# Patient Record
Sex: MALE | Race: WHITE | Employment: FULL TIME | URBAN - METROPOLITAN AREA
[De-identification: names, ages, dates, MRNs, and addresses within clinical notes are randomized per-mention and may not be internally consistent; named-entity substitution may affect disease eponyms.]

---

## 2022-07-25 ENCOUNTER — OFFICE VISIT (OUTPATIENT)
Dept: ENT CLINIC | Age: 27
End: 2022-07-25
Payer: COMMERCIAL

## 2022-07-25 VITALS
HEIGHT: 74 IN | WEIGHT: 243 LBS | SYSTOLIC BLOOD PRESSURE: 116 MMHG | BODY MASS INDEX: 31.18 KG/M2 | DIASTOLIC BLOOD PRESSURE: 82 MMHG

## 2022-07-25 DIAGNOSIS — R09.81 NASAL CONGESTION: ICD-10-CM

## 2022-07-25 DIAGNOSIS — H69.83 ETD (EUSTACHIAN TUBE DYSFUNCTION), BILATERAL: ICD-10-CM

## 2022-07-25 DIAGNOSIS — Z96.22 BILATERAL PATENT PRESSURE EQUALIZATION TUBES: ICD-10-CM

## 2022-07-25 DIAGNOSIS — J32.9 RECURRENT SINUSITIS: Primary | ICD-10-CM

## 2022-07-25 DIAGNOSIS — J34.89 SINUS PRESSURE: ICD-10-CM

## 2022-07-25 PROCEDURE — 99203 OFFICE O/P NEW LOW 30 MIN: CPT | Performed by: PHYSICIAN ASSISTANT

## 2022-07-25 ASSESSMENT — ENCOUNTER SYMPTOMS
ABDOMINAL PAIN: 0
COUGH: 0
EYE DISCHARGE: 0

## 2022-07-28 LAB
BACTERIA SPEC CULT: ABNORMAL
BACTERIA SPEC CULT: ABNORMAL
SERVICE CMNT-IMP: ABNORMAL

## 2022-07-29 ENCOUNTER — TELEPHONE (OUTPATIENT)
Dept: ENT CLINIC | Age: 27
End: 2022-07-29

## 2022-07-29 RX ORDER — CEFUROXIME AXETIL 500 MG/1
500 TABLET ORAL 2 TIMES DAILY
Qty: 20 TABLET | Refills: 0 | Status: SHIPPED | OUTPATIENT
Start: 2022-07-29 | End: 2022-08-08

## 2022-08-25 ENCOUNTER — OFFICE VISIT (OUTPATIENT)
Dept: ENT CLINIC | Age: 27
End: 2022-08-25
Payer: COMMERCIAL

## 2022-08-25 VITALS — HEIGHT: 74 IN | WEIGHT: 242 LBS | RESPIRATION RATE: 18 BRPM | BODY MASS INDEX: 31.06 KG/M2

## 2022-08-25 DIAGNOSIS — J34.89 SINUS PRESSURE: ICD-10-CM

## 2022-08-25 DIAGNOSIS — R09.81 NASAL CONGESTION: ICD-10-CM

## 2022-08-25 DIAGNOSIS — J32.9 RECURRENT SINUSITIS: Primary | ICD-10-CM

## 2022-08-25 PROCEDURE — 99213 OFFICE O/P EST LOW 20 MIN: CPT | Performed by: PHYSICIAN ASSISTANT

## 2022-08-25 ASSESSMENT — ENCOUNTER SYMPTOMS
VOMITING: 0
COLOR CHANGE: 0
WHEEZING: 0
DIARRHEA: 0
EYE PAIN: 0
COUGH: 0

## 2022-08-25 NOTE — PROGRESS NOTES
Chief Complaint   Patient presents with    Follow-up     Patient states that he is still congested. HPI:  Romain Villarreal is a 27 y.o. male seen to follow up on congestion. He states that he is not as congested in the morning but by the end of the day he is still congested. He reports that he continues to have pressure in his sinuses. Patient states that he completed his antibiotic but did not receive the nasal irrigations. Past Medical History, Past Surgical History, Family history, Social History, and Medications were all reviewed with the patient today and updated as necessary. No Known Allergies  There is no problem list on file for this patient. No current outpatient medications on file. No current facility-administered medications for this visit. History reviewed. No pertinent past medical history. Social History     Tobacco Use    Smoking status: Not on file    Smokeless tobacco: Not on file   Substance Use Topics    Alcohol use: Not on file     No past surgical history on file. No family history on file. ROS:    Review of Systems   Constitutional:  Negative for chills. HENT:  Positive for congestion. Eyes:  Negative for pain. Respiratory:  Negative for cough and wheezing. Cardiovascular:  Negative for chest pain and palpitations. Gastrointestinal:  Negative for diarrhea and vomiting. Musculoskeletal:  Negative for neck pain. Skin:  Negative for color change and wound. Allergic/Immunologic: Negative for environmental allergies. Neurological:  Negative for dizziness and headaches. PHYSICAL EXAM:    Resp 18   Ht 6' 2\" (1.88 m)   Wt 242 lb (109.8 kg)   BMI 31.07 kg/m²     Head  Head and Face - The head and face are atraumatic, normocephalic. The salivary glands are intact and the facial appearance is symmetric.     Head shape - No scars, lesions, or masses    Ear  Ear - Tympanic membranes are clear, the external auditory canal is without discharge and the tympanic membranes are mobile. There is no tympanic membrane erythema and no middle ear opacity is visualized. Pinna: bilateral - No hematomas or lacerations    Eye  Eyeball - bilateral - extraocular motions intact, equal in size and movement    Nose and Sinuses  Nose - mucosa is pink and the septum is midline. There are no nasal lesions and there was moderate turbinate hypertrophy. Mouth and Throat  Lips - upper lip - normal: no dryness, cracking, pallor, cyanosis, or vesicular eruption. Lower lip: normal: no dryness, cracking, pallor, cyanosis, or vesicular eruption. Teeth and Gums - No bleeding, no inflammation or ulceration. Lips - Pink and symmetrical  Oral Cavity - Oral mucosa pink, soft and hard palates contiguous and tongue moist without ulcers. The mucosa is without ulcerations. No oral cavity masses present. Parotid Gland - Bilateral - Non tender, not swollen. Oropharynx - No discharge or Erythema  Nasopharynx - Non obstructed, mucosa pink and moist.    Hypopharynx - No erythema  Submandibular Gland - Non tender, not swollen. Tonsils - Normal    Neck   Neck - Full range of motion and Supple. Non Tender. No Masses. Trachea - Midline. Thyroid - Gland - Symmetric. Non Tender. Nodules - No nodules. Neurologic - II - XII Grossly intact bilaterally    Cardiac  Inspection - Jugular Vein:  Bilateral - non distended, no prominent pulsations    Chest and Lung  Inspection - Movements:  Chest symmetrical with bilateral expansion, respirations even and non labored      ASSESSMENT and PLAN      ICD-10-CM    1. Recurrent sinusitis  J32.9 CT SINUS WO CONTRAST      2. Sinus pressure  J34.89 CT SINUS WO CONTRAST      3. Nasal congestion  R09.81 CT SINUS WO CONTRAST          I will order a CT scan of the sinuses to further evaluate since patient continues to report problems.      Jeovany Guadalupe PA-C  8/25/2022